# Patient Record
Sex: FEMALE | Race: BLACK OR AFRICAN AMERICAN | URBAN - METROPOLITAN AREA
[De-identification: names, ages, dates, MRNs, and addresses within clinical notes are randomized per-mention and may not be internally consistent; named-entity substitution may affect disease eponyms.]

---
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
## 2022-03-18 PROBLEM — Z02.5 SPORTS PHYSICAL: Status: ACTIVE | Noted: 2017-03-08

## 2022-03-18 PROBLEM — J45.20 MILD INTERMITTENT ASTHMA WITHOUT COMPLICATION: Status: ACTIVE | Noted: 2017-03-08

## 2022-12-16 ENCOUNTER — INITIAL PRENATAL (OUTPATIENT)
Dept: OBGYN CLINIC | Age: 22
End: 2022-12-16

## 2022-12-16 VITALS
OXYGEN SATURATION: 99 % | SYSTOLIC BLOOD PRESSURE: 111 MMHG | BODY MASS INDEX: 19.63 KG/M2 | HEART RATE: 74 BPM | WEIGHT: 115 LBS | DIASTOLIC BLOOD PRESSURE: 60 MMHG | HEIGHT: 64 IN | RESPIRATION RATE: 16 BRPM

## 2022-12-16 DIAGNOSIS — Z34.01 ENCOUNTER FOR SUPERVISION OF NORMAL FIRST PREGNANCY IN FIRST TRIMESTER: Primary | ICD-10-CM

## 2022-12-16 RX ORDER — ONDANSETRON 4 MG/1
4 TABLET, ORALLY DISINTEGRATING ORAL
COMMUNITY

## 2022-12-16 NOTE — PROGRESS NOTES
1. Have you been to the ER, urgent care clinic since your last visit? Hospitalized since your last visit? No    2. Have you seen or consulted any other health care providers outside of the [address] [place] [address] since your last visit? Include any pap smears or colon screening.  No    Visit Vitals  /60 (BP 1 Location: Right upper arm, BP Patient Position: Sitting, BP Cuff Size: Adult)   Pulse 74   Resp 16   Ht 5' 4\" (1.626 m)   Wt 115 lb (52.2 kg)   SpO2 99%   BMI 19.74 kg/m²       Chief Complaint   Patient presents with    Initial Prenatal Visit

## 2022-12-16 NOTE — PROGRESS NOTES
HISTORY OF PRESENT ILLNESS  Miri Rosales is a 25 y.o. female who presents today for the following:  Chief Complaint   Patient presents with    Initial Prenatal Visit   Is a 24-year-old G1 female who presents today as a new OB at 9 weeks and 5 days gestation. She is without complaints other than nausea on presentation today.     No Known Allergies    Current Outpatient Medications   Medication Sig    ondansetron (ZOFRAN ODT) 4 mg disintegrating tablet Take 4 mg by mouth every eight (8) hours as needed for Nausea or Vomiting. PNV WO.71/GFIGCHH fum/folic ac (PRENATAL PO) Take  by mouth. albuterol (VENTOLIN HFA) 90 mcg/actuation inhaler Take  by inhalation. No current facility-administered medications for this visit. Past Medical History:   Diagnosis Date    Asthma 2000       History reviewed. No pertinent surgical history.     Family History   Problem Relation Age of Onset    Diabetes Father     Heart murmur Father     Stroke Maternal Grandmother     Diabetes Maternal Grandfather     Heart Disease Maternal Grandfather     Cancer Paternal Grandmother     Diabetes Paternal Grandfather        Social History     Socioeconomic History    Marital status: UNKNOWN     Spouse name: Not on file    Number of children: Not on file    Years of education: Not on file    Highest education level: Not on file   Occupational History    Not on file   Tobacco Use    Smoking status: Never    Smokeless tobacco: Not on file   Substance and Sexual Activity    Alcohol use: No    Drug use: No    Sexual activity: Yes     Birth control/protection: None   Other Topics Concern    Not on file   Social History Narrative    Not on file     Social Determinants of Health     Financial Resource Strain: Not on file   Food Insecurity: Not on file   Transportation Needs: Not on file   Physical Activity: Not on file   Stress: Not on file   Social Connections: Not on file   Intimate Partner Violence: Not on file   Housing Stability: Not on file           REVIEW OF SYSTEMS     Constitutional: Negative for chills, fever and malaise/fatigue. HENT: Negative for congestion, hearing loss and sore throat. Respiratory: Negative for cough, sputum production, shortness of breath and wheezing. Cardiovascular: Negative for chest pain. Gastrointestinal: Negative for abdominal pain, constipation, diarrhea, nausea and vomiting. Genitourinary: Negative for dysuria, flank pain, hematuria and urgency. Neurological: Negative for dizziness, loss of consciousness, weakness and headaches. Psychiatric/Behavioral: Negative for depression.      PHYSICAL EXAM  /60 (BP 1 Location: Right upper arm, BP Patient Position: Sitting, BP Cuff Size: Adult)   Pulse 74   Resp 16   Ht 5' 4\" (1.626 m)   Wt 115 lb (52.2 kg)   SpO2 99%   BMI 19.74 kg/m²      Patient is a well-developed well-nourished female no apparent distress  She is alert and oriented x3  Head is normocephalic atraumatic pupils equal round react light accommodation  Neck is supple without adenopathy or thyromegaly  Heart is with regular rate and rhythm without murmurs rubs or gallops  Lungs are clear to auscultation and percussion bilaterally  Breasts are without masses bilaterally  Abdomen is soft nontender nondistended bowel sounds are present and active  Extremities are without clubbing cyanosis or edema  Pulses are full and symmetric bilaterally  Pelvic  External genitalia within normal limits  Urethra is midline there are no apparent urethral lesions the bladder is within normal limits  Vagina is with normal rugae there is minimal discharge present in the vaginal vault  Cervix is parous, there are no apparent cervical lesions, there is no cervical motion tenderness  Uterus is gravid and approximately 10 weeks size  Adnexa are without masses    ASSESSMENT and PLAN  Normal new OB visit at 9 weeks and 5 days  Plan: NU Pap sent, recommended Unisom and vitamin B6 for nausea, prenatal lab work ordered, follow-up in 4 weeks for routine OB  No results found for this visit on 12/16/22. Orders Placed This Encounter    ondansetron (ZOFRAN ODT) 4 mg disintegrating tablet     Sig: Take 4 mg by mouth every eight (8) hours as needed for Nausea or Vomiting. PNV AB.84/XRPPPJB fum/folic ac (PRENATAL PO)     Sig: Take  by mouth. Normal new OB visit at 9 weeks and 5 days. Follow-up in 4 weeks for routine OB.

## 2023-01-04 RX ORDER — PROMETHAZINE HYDROCHLORIDE 12.5 MG/1
12.5 TABLET ORAL
Qty: 30 TABLET | Refills: 3 | Status: SHIPPED | OUTPATIENT
Start: 2023-01-04

## 2023-01-13 ENCOUNTER — ROUTINE PRENATAL (OUTPATIENT)
Dept: OBGYN CLINIC | Age: 23
End: 2023-01-13
Payer: COMMERCIAL

## 2023-01-13 VITALS — SYSTOLIC BLOOD PRESSURE: 108 MMHG | DIASTOLIC BLOOD PRESSURE: 56 MMHG | WEIGHT: 118.6 LBS | BODY MASS INDEX: 20.36 KG/M2

## 2023-01-13 DIAGNOSIS — Z34.02 ENCOUNTER FOR SUPERVISION OF NORMAL FIRST PREGNANCY IN SECOND TRIMESTER: Primary | ICD-10-CM

## 2023-01-13 PROCEDURE — 0502F SUBSEQUENT PRENATAL CARE: CPT | Performed by: OBSTETRICS & GYNECOLOGY

## 2023-01-13 RX ORDER — METRONIDAZOLE 500 MG/1
500 TABLET ORAL 2 TIMES DAILY
Qty: 14 TABLET | Refills: 0 | Status: SHIPPED | OUTPATIENT
Start: 2023-01-13 | End: 2023-01-20

## 2023-01-13 NOTE — PROGRESS NOTES
Normal 13-week prenatal visit. Patient tested positive for trichomonas and bacterial vaginosis at initial prenatal visit we are now treating with Flagyl 500 mg every 12 hours x7 days.   Follow-up in 4 weeks for routine OB

## 2023-02-10 ENCOUNTER — ROUTINE PRENATAL (OUTPATIENT)
Dept: OBGYN CLINIC | Age: 23
End: 2023-02-10
Payer: COMMERCIAL

## 2023-02-10 VITALS — DIASTOLIC BLOOD PRESSURE: 60 MMHG | SYSTOLIC BLOOD PRESSURE: 112 MMHG | BODY MASS INDEX: 20.8 KG/M2 | WEIGHT: 121.2 LBS

## 2023-02-10 DIAGNOSIS — Z34.02 ENCOUNTER FOR SUPERVISION OF NORMAL FIRST PREGNANCY IN SECOND TRIMESTER: Primary | ICD-10-CM

## 2023-02-10 PROCEDURE — 0502F SUBSEQUENT PRENATAL CARE: CPT | Performed by: OBSTETRICS & GYNECOLOGY

## 2023-03-10 ENCOUNTER — ROUTINE PRENATAL (OUTPATIENT)
Dept: OBGYN CLINIC | Age: 23
End: 2023-03-10

## 2023-03-10 VITALS — WEIGHT: 126 LBS | DIASTOLIC BLOOD PRESSURE: 58 MMHG | SYSTOLIC BLOOD PRESSURE: 112 MMHG | BODY MASS INDEX: 21.63 KG/M2

## 2023-03-10 DIAGNOSIS — Z34.02 ENCOUNTER FOR SUPERVISION OF NORMAL FIRST PREGNANCY IN SECOND TRIMESTER: Primary | ICD-10-CM

## 2023-04-07 ENCOUNTER — ROUTINE PRENATAL (OUTPATIENT)
Dept: OBGYN CLINIC | Age: 23
End: 2023-04-07
Payer: COMMERCIAL

## 2023-04-07 PROCEDURE — 0502F SUBSEQUENT PRENATAL CARE: CPT | Performed by: OBSTETRICS & GYNECOLOGY

## 2023-04-28 ENCOUNTER — ROUTINE PRENATAL (OUTPATIENT)
Dept: OBGYN CLINIC | Age: 23
End: 2023-04-28
Payer: COMMERCIAL

## 2023-04-28 VITALS — BODY MASS INDEX: 22.83 KG/M2 | DIASTOLIC BLOOD PRESSURE: 63 MMHG | SYSTOLIC BLOOD PRESSURE: 120 MMHG | WEIGHT: 133 LBS

## 2023-04-28 DIAGNOSIS — Z34.83 PRENATAL CARE, SUBSEQUENT PREGNANCY, THIRD TRIMESTER: Primary | ICD-10-CM

## 2023-04-28 PROCEDURE — 0502F SUBSEQUENT PRENATAL CARE: CPT | Performed by: OBSTETRICS & GYNECOLOGY

## 2023-04-29 RX ORDER — PROMETHAZINE HYDROCHLORIDE 12.5 MG/1
12.5 TABLET ORAL EVERY 6 HOURS PRN
COMMUNITY

## 2023-04-29 RX ORDER — ALBUTEROL SULFATE 90 UG/1
2 AEROSOL, METERED RESPIRATORY (INHALATION) EVERY 6 HOURS PRN
COMMUNITY

## 2023-04-29 RX ORDER — ONDANSETRON 4 MG/1
4 TABLET, FILM COATED ORAL EVERY 8 HOURS PRN
COMMUNITY

## 2023-05-15 ENCOUNTER — ROUTINE PRENATAL (OUTPATIENT)
Age: 23
End: 2023-05-15

## 2023-05-15 VITALS — BODY MASS INDEX: 23.82 KG/M2 | SYSTOLIC BLOOD PRESSURE: 106 MMHG | WEIGHT: 138.8 LBS | DIASTOLIC BLOOD PRESSURE: 63 MMHG

## 2023-05-15 DIAGNOSIS — Z34.83 PRENATAL CARE, SUBSEQUENT PREGNANCY, THIRD TRIMESTER: Primary | ICD-10-CM

## 2023-05-15 PROCEDURE — 0502F SUBSEQUENT PRENATAL CARE: CPT | Performed by: OBSTETRICS & GYNECOLOGY

## 2023-05-15 NOTE — PROGRESS NOTES
Normal 31-week prenatal visit. Patient reports difficulty sleeping due to fetal movement. Mended body pillow but patient states she already has this. Follow-up in 2 weeks for routine OB.

## 2023-05-15 NOTE — PROGRESS NOTES
Chief Complaint   Patient presents with    Routine Prenatal Visit       /63 (Site: Left Upper Arm, Position: Sitting, Cuff Size: Small Adult)   Wt 138 lb 12.8 oz (63 kg)   BMI 23.82 kg/m²

## 2023-05-16 ENCOUNTER — HOSPITAL ENCOUNTER (OUTPATIENT)
Facility: HOSPITAL | Age: 23
Discharge: HOME | End: 2023-05-16
Attending: OBSTETRICS & GYNECOLOGY | Admitting: OBSTETRICS & GYNECOLOGY

## 2023-05-16 ENCOUNTER — TELEPHONE (OUTPATIENT)
Age: 23
End: 2023-05-16

## 2023-05-16 VITALS
DIASTOLIC BLOOD PRESSURE: 74 MMHG | RESPIRATION RATE: 16 BRPM | OXYGEN SATURATION: 100 % | HEART RATE: 81 BPM | TEMPERATURE: 98.4 F | SYSTOLIC BLOOD PRESSURE: 124 MMHG

## 2023-05-16 PROBLEM — Z33.1 PREGNANCY AS INCIDENTAL FINDING: Status: ACTIVE | Noted: 2023-05-16

## 2023-05-16 LAB
APPEARANCE UR: CLEAR
BACTERIA URNS QL MICRO: NEGATIVE /HPF
BILIRUB UR QL: NEGATIVE
COLOR UR: NORMAL
EPITH CASTS URNS QL MICRO: NORMAL /LPF
GLUCOSE UR STRIP.AUTO-MCNC: NEGATIVE MG/DL
HGB UR QL STRIP: NEGATIVE
KETONES UR QL STRIP.AUTO: NEGATIVE MG/DL
LEUKOCYTE ESTERASE UR QL STRIP.AUTO: NEGATIVE
NITRITE UR QL STRIP.AUTO: NEGATIVE
PH UR STRIP: 7 (ref 5–8)
PROT UR STRIP-MCNC: NEGATIVE MG/DL
RBC #/AREA URNS HPF: NORMAL /HPF (ref 0–5)
SP GR UR REFRACTOMETRY: <1.005 (ref 1–1.03)
URINE CULTURE IF INDICATED: NORMAL
UROBILINOGEN UR QL STRIP.AUTO: 0.1 EU/DL (ref 0.1–1)
WBC URNS QL MICRO: NORMAL /HPF (ref 0–4)

## 2023-05-16 PROCEDURE — 96361 HYDRATE IV INFUSION ADD-ON: CPT

## 2023-05-16 PROCEDURE — 2580000003 HC RX 258: Performed by: OBSTETRICS & GYNECOLOGY

## 2023-05-16 PROCEDURE — 81001 URINALYSIS AUTO W/SCOPE: CPT

## 2023-05-16 PROCEDURE — 96372 THER/PROPH/DIAG INJ SC/IM: CPT

## 2023-05-16 PROCEDURE — 99285 EMERGENCY DEPT VISIT HI MDM: CPT

## 2023-05-16 PROCEDURE — 96360 HYDRATION IV INFUSION INIT: CPT

## 2023-05-16 PROCEDURE — 6360000002 HC RX W HCPCS

## 2023-05-16 RX ORDER — SODIUM CHLORIDE, SODIUM LACTATE, POTASSIUM CHLORIDE, AND CALCIUM CHLORIDE .6; .31; .03; .02 G/100ML; G/100ML; G/100ML; G/100ML
1000 INJECTION, SOLUTION INTRAVENOUS ONCE
Status: COMPLETED | OUTPATIENT
Start: 2023-05-16 | End: 2023-05-16

## 2023-05-16 RX ORDER — TERBUTALINE SULFATE 1 MG/ML
0.25 INJECTION, SOLUTION SUBCUTANEOUS ONCE
Status: COMPLETED | OUTPATIENT
Start: 2023-05-16 | End: 2023-05-16

## 2023-05-16 RX ORDER — TERBUTALINE SULFATE 1 MG/ML
INJECTION, SOLUTION SUBCUTANEOUS
Status: COMPLETED
Start: 2023-05-16 | End: 2023-05-16

## 2023-05-16 RX ADMIN — SODIUM CHLORIDE, POTASSIUM CHLORIDE, SODIUM LACTATE AND CALCIUM CHLORIDE 1000 ML: 600; 310; 30; 20 INJECTION, SOLUTION INTRAVENOUS at 18:32

## 2023-05-16 RX ADMIN — SODIUM CHLORIDE, POTASSIUM CHLORIDE, SODIUM LACTATE AND CALCIUM CHLORIDE 1000 ML: 600; 310; 30; 20 INJECTION, SOLUTION INTRAVENOUS at 19:16

## 2023-05-16 RX ADMIN — TERBUTALINE SULFATE 0.25 MG: 1 INJECTION, SOLUTION SUBCUTANEOUS at 18:46

## 2023-05-16 NOTE — PROGRESS NOTES
1811-PT arrives to labor with complaints of abdominal pain with cramping in the lower abdomen, denies d/c, leaking or bleeding. Endorses fetal movement. Endorses urinary frequency.

## 2023-05-16 NOTE — TELEPHONE ENCOUNTER
Patient's mother called stating her daughter has been cramping since yesterday and it has gotten progressively worse. States she is drinking water and lying on her left side and nothing is helping. Advised her to take her daughter to L&D for evaluation and they were notified.

## 2023-05-17 NOTE — H&P
Department of Obstetrics and Gynecology   Obstetrics History and Physical        CHIEF COMPLAINT:   with contractions few hours   No hx of LPV  No Hx of vag bleeding    HISTORY OF PRESENT ILLNESS:      The patient is a 21 y.o. female at 32w1d. OB History          1    Para        Term                AB        Living             SAB        IAB        Ectopic        Molar        Multiple        Live Births                Patient presents with a chief complaint as above and is being admitted for observation    Estimated Due Date: Estimated Date of Delivery: 23    PRENATAL CARE:    Complicated by: none    PAST OB HISTORY:  OB History    Para Term  AB Living   1 0 0 0 0 0   SAB IAB Ectopic Molar Multiple Live Births   0 0 0 0 0 0      # Outcome Date GA Lbr Hamilton/2nd Weight Sex Delivery Anes PTL Lv   1 Current                 Past Medical History:    No past medical history on file. Past Surgical History:    No past surgical history on file. Allergies:    No Known Allergies     Social History:    Social History     Socioeconomic History    Marital status: Not on file     Spouse name: Not on file    Number of children: Not on file    Years of education: Not on file    Highest education level: Not on file   Occupational History    Not on file   Tobacco Use    Smoking status: Not on file    Smokeless tobacco: Not on file   Substance and Sexual Activity    Alcohol use: Not on file    Drug use: Not on file    Sexual activity: Not on file   Other Topics Concern    Not on file   Social History Narrative    Not on file     Social Determinants of Health     Financial Resource Strain: Not on file   Food Insecurity: Not on file   Transportation Needs: Not on file   Physical Activity: Not on file   Stress: Not on file   Social Connections: Not on file   Intimate Partner Violence: Not on file   Housing Stability: Not on file       Family History:   No family history on file.     Medications

## 2023-05-17 NOTE — PROGRESS NOTES
0: Bedside shift report received from ROBBIE Regan. Pt alert in bed states she is feeling better. : Dr. Cyndie Walker updated about pt, order received for discharge. : Discharge instructions discussed with pt, pregnancy precautions,  labor and fetal kick count reviewed. Pt to call provider or return if signs of labor, baby not moving as before, any questions or concerns. Pt encouraged to drink lots of water to keep hydrated. Pt verbalized understanding and had no questions or concerns. : Pt pushed off unit to ED in wheelchair by Rn for home accompanied by parents. Discharge papers given.

## 2023-06-02 ENCOUNTER — ROUTINE PRENATAL (OUTPATIENT)
Age: 23
End: 2023-06-02

## 2023-06-02 VITALS — WEIGHT: 143 LBS | SYSTOLIC BLOOD PRESSURE: 119 MMHG | DIASTOLIC BLOOD PRESSURE: 73 MMHG | BODY MASS INDEX: 24.55 KG/M2

## 2023-06-02 DIAGNOSIS — Z34.83 PRENATAL CARE, SUBSEQUENT PREGNANCY, THIRD TRIMESTER: Primary | ICD-10-CM

## 2023-06-22 ENCOUNTER — ROUTINE PRENATAL (OUTPATIENT)
Age: 23
End: 2023-06-22

## 2023-06-22 VITALS — SYSTOLIC BLOOD PRESSURE: 125 MMHG | DIASTOLIC BLOOD PRESSURE: 78 MMHG | WEIGHT: 149 LBS | BODY MASS INDEX: 25.58 KG/M2

## 2023-06-22 DIAGNOSIS — Z34.83 PRENATAL CARE, SUBSEQUENT PREGNANCY, THIRD TRIMESTER: Primary | ICD-10-CM

## 2023-06-30 ENCOUNTER — ROUTINE PRENATAL (OUTPATIENT)
Age: 23
End: 2023-06-30

## 2023-06-30 VITALS — SYSTOLIC BLOOD PRESSURE: 123 MMHG | BODY MASS INDEX: 26.5 KG/M2 | WEIGHT: 154.4 LBS | DIASTOLIC BLOOD PRESSURE: 72 MMHG

## 2023-06-30 DIAGNOSIS — Z34.83 PRENATAL CARE, SUBSEQUENT PREGNANCY, THIRD TRIMESTER: Primary | ICD-10-CM

## 2023-07-07 ENCOUNTER — ROUTINE PRENATAL (OUTPATIENT)
Age: 23
End: 2023-07-07

## 2023-07-07 VITALS — WEIGHT: 159 LBS | BODY MASS INDEX: 27.29 KG/M2 | SYSTOLIC BLOOD PRESSURE: 152 MMHG | DIASTOLIC BLOOD PRESSURE: 85 MMHG

## 2023-07-07 DIAGNOSIS — R03.0 ELEVATED BLOOD PRESSURE READING: Primary | ICD-10-CM

## 2023-07-07 DIAGNOSIS — Z34.83 PRENATAL CARE, SUBSEQUENT PREGNANCY, THIRD TRIMESTER: ICD-10-CM

## 2023-07-07 NOTE — PROGRESS NOTES
PA week prenatal visit with mildly elevated blood pressures. We will obtain preeclampsia labs. Follow-up in 1 week for routine OB.

## 2023-07-08 ENCOUNTER — HOSPITAL ENCOUNTER (OUTPATIENT)
Facility: HOSPITAL | Age: 23
Discharge: HOME | End: 2023-07-09
Attending: OBSTETRICS & GYNECOLOGY | Admitting: OBSTETRICS & GYNECOLOGY
Payer: COMMERCIAL

## 2023-07-08 PROBLEM — Z34.90 TERM PREGNANCY: Status: ACTIVE | Noted: 2023-07-08

## 2023-07-08 LAB
ALBUMIN SERPL-MCNC: 2.6 G/DL (ref 3.5–5)
ALBUMIN SERPL-MCNC: 2.6 G/DL (ref 3.5–5)
ALBUMIN SERPL-MCNC: 3.6 G/DL (ref 3.9–5)
ALBUMIN/GLOB SERPL: 0.7 (ref 1.1–2.2)
ALBUMIN/GLOB SERPL: 0.7 (ref 1.1–2.2)
ALBUMIN/GLOB SERPL: 1.4 {RATIO} (ref 1.2–2.2)
ALP SERPL-CCNC: 475 U/L (ref 45–117)
ALP SERPL-CCNC: 480 U/L (ref 45–117)
ALP SERPL-CCNC: 491 IU/L (ref 44–121)
ALT SERPL-CCNC: 12 IU/L (ref 0–32)
ALT SERPL-CCNC: 18 U/L (ref 12–78)
ALT SERPL-CCNC: 18 U/L (ref 12–78)
ANION GAP SERPL CALC-SCNC: 5 MMOL/L (ref 5–15)
AST SERPL W P-5'-P-CCNC: 17 U/L (ref 15–37)
AST SERPL W P-5'-P-CCNC: 18 U/L (ref 15–37)
AST SERPL-CCNC: 20 IU/L (ref 0–40)
BASOPHILS # BLD AUTO: 0 X10E3/UL (ref 0–0.2)
BASOPHILS # BLD: 0 K/UL (ref 0–0.1)
BASOPHILS NFR BLD AUTO: 0 %
BASOPHILS NFR BLD: 0 % (ref 0–1)
BILIRUB DIRECT SERPL-MCNC: <0.1 MG/DL (ref 0–0.2)
BILIRUB SERPL-MCNC: 0.3 MG/DL (ref 0.2–1)
BILIRUB SERPL-MCNC: 0.3 MG/DL (ref 0.2–1)
BILIRUB SERPL-MCNC: 0.3 MG/DL (ref 0–1.2)
BUN SERPL-MCNC: 3 MG/DL (ref 6–20)
BUN SERPL-MCNC: 4 MG/DL (ref 6–20)
BUN/CREAT SERPL: 5 (ref 9–23)
BUN/CREAT SERPL: 6 (ref 12–20)
CA-I BLD-MCNC: 8.8 MG/DL (ref 8.5–10.1)
CALCIUM SERPL-MCNC: 8.7 MG/DL (ref 8.7–10.2)
CHLORIDE SERPL-SCNC: 104 MMOL/L (ref 96–106)
CHLORIDE SERPL-SCNC: 109 MMOL/L (ref 97–108)
CO2 SERPL-SCNC: 22 MMOL/L (ref 20–29)
CO2 SERPL-SCNC: 25 MMOL/L (ref 21–32)
CREAT SERPL-MCNC: 0.62 MG/DL (ref 0.57–1)
CREAT SERPL-MCNC: 0.69 MG/DL (ref 0.55–1.02)
CREAT UR-MCNC: <13 MG/DL
DIFFERENTIAL METHOD BLD: ABNORMAL
EGFRCR SERPLBLD CKD-EPI 2021: 128 ML/MIN/1.73
EOSINOPHIL # BLD AUTO: 0 X10E3/UL (ref 0–0.4)
EOSINOPHIL # BLD: 0 K/UL (ref 0–0.4)
EOSINOPHIL NFR BLD AUTO: 0 %
EOSINOPHIL NFR BLD: 0 % (ref 0–7)
ERYTHROCYTE [DISTWIDTH] IN BLOOD BY AUTOMATED COUNT: 14.1 % (ref 11.7–15.4)
ERYTHROCYTE [DISTWIDTH] IN BLOOD BY AUTOMATED COUNT: 14.5 % (ref 11.5–14.5)
GLOBULIN SER CALC-MCNC: 2.5 G/DL (ref 1.5–4.5)
GLOBULIN SER CALC-MCNC: 3.7 G/DL (ref 2–4)
GLOBULIN SER CALC-MCNC: 3.8 G/DL (ref 2–4)
GLUCOSE SERPL-MCNC: 69 MG/DL (ref 70–99)
GLUCOSE SERPL-MCNC: 79 MG/DL (ref 65–100)
HCT VFR BLD AUTO: 29 % (ref 35–47)
HCT VFR BLD AUTO: 29.8 % (ref 34–46.6)
HGB BLD-MCNC: 9.5 G/DL (ref 11.5–16)
HGB BLD-MCNC: 9.9 G/DL (ref 11.1–15.9)
IMM GRANULOCYTES # BLD AUTO: 0.1 K/UL (ref 0–0.04)
IMM GRANULOCYTES # BLD AUTO: 0.1 X10E3/UL (ref 0–0.1)
IMM GRANULOCYTES NFR BLD AUTO: 2 %
IMM GRANULOCYTES NFR BLD AUTO: 2 % (ref 0–0.5)
LYMPHOCYTES # BLD AUTO: 0.9 X10E3/UL (ref 0.7–3.1)
LYMPHOCYTES # BLD: 1.3 K/UL (ref 0.8–3.5)
LYMPHOCYTES NFR BLD AUTO: 13 %
LYMPHOCYTES NFR BLD: 18 % (ref 12–49)
MCH RBC QN AUTO: 30.2 PG (ref 26–34)
MCH RBC QN AUTO: 30.3 PG (ref 26.6–33)
MCHC RBC AUTO-ENTMCNC: 32.8 G/DL (ref 30–36.5)
MCHC RBC AUTO-ENTMCNC: 33.2 G/DL (ref 31.5–35.7)
MCV RBC AUTO: 91 FL (ref 79–97)
MCV RBC AUTO: 92.1 FL (ref 80–99)
MONOCYTES # BLD AUTO: 0.7 X10E3/UL (ref 0.1–0.9)
MONOCYTES # BLD: 0.7 K/UL (ref 0–1)
MONOCYTES NFR BLD AUTO: 10 %
MONOCYTES NFR BLD: 10 % (ref 5–13)
NEUTROPHILS # BLD AUTO: 5 X10E3/UL (ref 1.4–7)
NEUTROPHILS NFR BLD AUTO: 75 %
NEUTS SEG # BLD: 5.1 K/UL (ref 1.8–8)
NEUTS SEG NFR BLD: 70 % (ref 32–75)
NRBC # BLD: 0.04 K/UL (ref 0–0.01)
NRBC BLD-RTO: 0.6 PER 100 WBC
PLATELET # BLD AUTO: 237 X10E3/UL (ref 150–450)
PLATELET # BLD AUTO: 238 K/UL (ref 150–400)
PMV BLD AUTO: 11 FL (ref 8.9–12.9)
POTASSIUM SERPL-SCNC: 3.8 MMOL/L (ref 3.5–5.1)
POTASSIUM SERPL-SCNC: 4.1 MMOL/L (ref 3.5–5.2)
PROT SERPL-MCNC: 6.1 G/DL (ref 6–8.5)
PROT SERPL-MCNC: 6.3 G/DL (ref 6.4–8.2)
PROT SERPL-MCNC: 6.4 G/DL (ref 6.4–8.2)
PROT UR-MCNC: <5 MG/DL (ref 0–11.9)
PROT/CREAT UR-RTO: NORMAL
RBC # BLD AUTO: 3.15 M/UL (ref 3.8–5.2)
RBC # BLD AUTO: 3.27 X10E6/UL (ref 3.77–5.28)
SODIUM SERPL-SCNC: 139 MMOL/L (ref 136–145)
SODIUM SERPL-SCNC: 140 MMOL/L (ref 134–144)
URATE SERPL-MCNC: 5 MG/DL (ref 2.6–6.2)
WBC # BLD AUTO: 6.6 X10E3/UL (ref 3.4–10.8)
WBC # BLD AUTO: 7.2 K/UL (ref 3.6–11)

## 2023-07-08 PROCEDURE — 84156 ASSAY OF PROTEIN URINE: CPT

## 2023-07-08 PROCEDURE — 80076 HEPATIC FUNCTION PANEL: CPT

## 2023-07-08 PROCEDURE — 82570 ASSAY OF URINE CREATININE: CPT

## 2023-07-08 PROCEDURE — 80053 COMPREHEN METABOLIC PANEL: CPT

## 2023-07-08 PROCEDURE — 36415 COLL VENOUS BLD VENIPUNCTURE: CPT

## 2023-07-08 PROCEDURE — 85025 COMPLETE CBC W/AUTO DIFF WBC: CPT

## 2023-07-09 VITALS
OXYGEN SATURATION: 99 % | WEIGHT: 159 LBS | HEART RATE: 69 BPM | TEMPERATURE: 99.1 F | RESPIRATION RATE: 20 BRPM | BODY MASS INDEX: 27.14 KG/M2 | HEIGHT: 64 IN | SYSTOLIC BLOOD PRESSURE: 139 MMHG | DIASTOLIC BLOOD PRESSURE: 82 MMHG

## 2023-07-09 PROCEDURE — 99213 OFFICE O/P EST LOW 20 MIN: CPT | Performed by: OBSTETRICS & GYNECOLOGY

## 2023-07-09 PROCEDURE — 59025 FETAL NON-STRESS TEST: CPT

## 2023-07-09 PROCEDURE — 0502F SUBSEQUENT PRENATAL CARE: CPT | Performed by: OBSTETRICS & GYNECOLOGY

## 2023-07-09 PROCEDURE — 99285 EMERGENCY DEPT VISIT HI MDM: CPT

## 2023-07-09 NOTE — H&P
Department of Obstetrics and Gynecology   Obstetrics History and Physical        CHIEF COMPLAINT:  ANIL ALFREDO with Iup 39 weeks   Sent for BP evaluation with hx of elevated Bps in office  No Hx of Headache, blurring of vision    HISTORY OF PRESENT ILLNESS:      The patient is a 21 y.o. female at 39w0d. OB History          1    Para        Term                AB        Living             SAB        IAB        Ectopic        Molar        Multiple        Live Births                Patient presents with a chief complaint as above and is being admitted for hypertension and observation    Estimated Due Date: Estimated Date of Delivery: 23    PRENATAL CARE:    Complicated by: none    PAST OB HISTORY:  OB History    Para Term  AB Living   1 0 0 0 0 0   SAB IAB Ectopic Molar Multiple Live Births   0 0 0 0 0 0      # Outcome Date GA Lbr Hamilton/2nd Weight Sex Delivery Anes PTL Lv   1 Current                 Past Medical History:        Diagnosis Date    Asthma          Past Surgical History:    No past surgical history on file.     Allergies:    No Known Allergies     Social History:    Social History     Socioeconomic History    Marital status: Unknown     Spouse name: Not on file    Number of children: Not on file    Years of education: Not on file    Highest education level: Not on file   Occupational History    Not on file   Tobacco Use    Smoking status: Never    Smokeless tobacco: Never   Substance and Sexual Activity    Alcohol use: Not Currently    Drug use: Never    Sexual activity: Yes     Partners: Male   Other Topics Concern    Not on file   Social History Narrative    Not on file     Social Determinants of Health     Financial Resource Strain: Not on file   Food Insecurity: Not on file   Transportation Needs: Not on file   Physical Activity: Not on file   Stress: Not on file   Social Connections: Not on file   Intimate Partner Violence: Not on file   Housing Stability: Not on file

## 2023-07-10 ENCOUNTER — ROUTINE PRENATAL (OUTPATIENT)
Age: 23
End: 2023-07-10

## 2023-07-10 VITALS — WEIGHT: 158 LBS | DIASTOLIC BLOOD PRESSURE: 91 MMHG | BODY MASS INDEX: 27.12 KG/M2 | SYSTOLIC BLOOD PRESSURE: 145 MMHG

## 2023-07-10 DIAGNOSIS — Z34.83 PRENATAL CARE, SUBSEQUENT PREGNANCY, THIRD TRIMESTER: Primary | ICD-10-CM

## 2023-07-10 PROCEDURE — 0502F SUBSEQUENT PRENATAL CARE: CPT | Performed by: OBSTETRICS & GYNECOLOGY

## 2023-07-10 NOTE — PROGRESS NOTES
Prenatal visit with mildly elevated blood pressures at 39 weeks and 1 day. We will schedule scheduled for induction of labor to start with Cytotec on 7/11/2023 and induction to proceed on 7/12/2023.

## 2023-07-11 ENCOUNTER — HOSPITAL ENCOUNTER (INPATIENT)
Facility: HOSPITAL | Age: 23
LOS: 4 days | Discharge: HOME | End: 2023-07-15
Attending: OBSTETRICS & GYNECOLOGY | Admitting: OBSTETRICS & GYNECOLOGY
Payer: COMMERCIAL

## 2023-07-11 PROBLEM — Z3A.39 39 WEEKS GESTATION OF PREGNANCY: Status: ACTIVE | Noted: 2023-07-11

## 2023-07-11 LAB
ABO + RH BLD: NORMAL
ALBUMIN SERPL-MCNC: 2.8 G/DL (ref 3.5–5)
ALBUMIN/GLOB SERPL: 0.8 (ref 1.1–2.2)
ALP SERPL-CCNC: 485 U/L (ref 45–117)
ALT SERPL-CCNC: 17 U/L (ref 12–78)
AMPHET UR QL SCN: NEGATIVE
ANION GAP SERPL CALC-SCNC: 6 MMOL/L (ref 5–15)
APPEARANCE UR: CLEAR
AST SERPL W P-5'-P-CCNC: 18 U/L (ref 15–37)
BACTERIA URNS QL MICRO: ABNORMAL /HPF
BARBITURATES UR QL SCN: NEGATIVE
BASOPHILS # BLD: 0 K/UL (ref 0–0.1)
BASOPHILS NFR BLD: 0 % (ref 0–1)
BENZODIAZ UR QL: NEGATIVE
BILIRUB SERPL-MCNC: 0.4 MG/DL (ref 0.2–1)
BILIRUB UR QL: NEGATIVE
BLOOD GROUP ANTIBODIES SERPL: NEGATIVE
BUN SERPL-MCNC: 6 MG/DL (ref 6–20)
BUN/CREAT SERPL: 7 (ref 12–20)
CA-I BLD-MCNC: 8.9 MG/DL (ref 8.5–10.1)
CANNABINOIDS UR QL SCN: NEGATIVE
CHLORIDE SERPL-SCNC: 111 MMOL/L (ref 97–108)
CO2 SERPL-SCNC: 24 MMOL/L (ref 21–32)
COCAINE UR QL SCN: NEGATIVE
COLOR UR: ABNORMAL
CREAT SERPL-MCNC: 0.82 MG/DL (ref 0.55–1.02)
DIFFERENTIAL METHOD BLD: ABNORMAL
EOSINOPHIL # BLD: 0 K/UL (ref 0–0.4)
EOSINOPHIL NFR BLD: 0 % (ref 0–7)
EPITH CASTS URNS QL MICRO: ABNORMAL /LPF
ERYTHROCYTE [DISTWIDTH] IN BLOOD BY AUTOMATED COUNT: 14.9 % (ref 11.5–14.5)
GLOBULIN SER CALC-MCNC: 3.7 G/DL (ref 2–4)
GLUCOSE SERPL-MCNC: 132 MG/DL (ref 65–100)
GLUCOSE UR STRIP.AUTO-MCNC: NEGATIVE MG/DL
HCT VFR BLD AUTO: 29.1 % (ref 35–47)
HGB BLD-MCNC: 9.5 G/DL (ref 11.5–16)
HGB UR QL STRIP: NEGATIVE
IMM GRANULOCYTES # BLD AUTO: 0.1 K/UL (ref 0–0.04)
IMM GRANULOCYTES NFR BLD AUTO: 1 % (ref 0–0.5)
KETONES UR QL STRIP.AUTO: NEGATIVE MG/DL
LEUKOCYTE ESTERASE UR QL STRIP.AUTO: ABNORMAL
LYMPHOCYTES # BLD: 1 K/UL (ref 0.8–3.5)
LYMPHOCYTES NFR BLD: 15 % (ref 12–49)
Lab: NORMAL
MCH RBC QN AUTO: 29.9 PG (ref 26–34)
MCHC RBC AUTO-ENTMCNC: 32.6 G/DL (ref 30–36.5)
MCV RBC AUTO: 91.5 FL (ref 80–99)
METHADONE UR QL: NEGATIVE
MONOCYTES # BLD: 0.5 K/UL (ref 0–1)
MONOCYTES NFR BLD: 8 % (ref 5–13)
MUCOUS THREADS URNS QL MICRO: ABNORMAL /LPF
NEUTS SEG # BLD: 4.8 K/UL (ref 1.8–8)
NEUTS SEG NFR BLD: 76 % (ref 32–75)
NITRITE UR QL STRIP.AUTO: NEGATIVE
NRBC # BLD: 0.02 K/UL (ref 0–0.01)
NRBC BLD-RTO: 0.3 PER 100 WBC
OPIATES UR QL: NEGATIVE
PCP UR QL: NEGATIVE
PH UR STRIP: 8 (ref 5–8)
PLATELET # BLD AUTO: 254 K/UL (ref 150–400)
PMV BLD AUTO: 11.5 FL (ref 8.9–12.9)
POTASSIUM SERPL-SCNC: 3.5 MMOL/L (ref 3.5–5.1)
PROT SERPL-MCNC: 6.5 G/DL (ref 6.4–8.2)
PROT UR STRIP-MCNC: NEGATIVE MG/DL
RBC # BLD AUTO: 3.18 M/UL (ref 3.8–5.2)
RBC #/AREA URNS HPF: ABNORMAL /HPF (ref 0–5)
SODIUM SERPL-SCNC: 141 MMOL/L (ref 136–145)
SP GR UR REFRACTOMETRY: <1.005 (ref 1–1.03)
SPECIMEN EXP DATE BLD: NORMAL
UROBILINOGEN UR QL STRIP.AUTO: 0.1 EU/DL (ref 0.1–1)
WBC # BLD AUTO: 6.3 K/UL (ref 3.6–11)
WBC URNS QL MICRO: ABNORMAL /HPF (ref 0–4)

## 2023-07-11 PROCEDURE — 86901 BLOOD TYPING SEROLOGIC RH(D): CPT

## 2023-07-11 PROCEDURE — 80307 DRUG TEST PRSMV CHEM ANLYZR: CPT

## 2023-07-11 PROCEDURE — 6370000000 HC RX 637 (ALT 250 FOR IP): Performed by: OBSTETRICS & GYNECOLOGY

## 2023-07-11 PROCEDURE — 80053 COMPREHEN METABOLIC PANEL: CPT

## 2023-07-11 PROCEDURE — 36415 COLL VENOUS BLD VENIPUNCTURE: CPT

## 2023-07-11 PROCEDURE — 81001 URINALYSIS AUTO W/SCOPE: CPT

## 2023-07-11 PROCEDURE — 86850 RBC ANTIBODY SCREEN: CPT

## 2023-07-11 PROCEDURE — 1120000000 HC RM PRIVATE OB

## 2023-07-11 PROCEDURE — 86900 BLOOD TYPING SEROLOGIC ABO: CPT

## 2023-07-11 PROCEDURE — 85025 COMPLETE CBC W/AUTO DIFF WBC: CPT

## 2023-07-11 RX ORDER — SODIUM CHLORIDE, SODIUM LACTATE, POTASSIUM CHLORIDE, CALCIUM CHLORIDE 600; 310; 30; 20 MG/100ML; MG/100ML; MG/100ML; MG/100ML
INJECTION, SOLUTION INTRAVENOUS CONTINUOUS
Status: DISCONTINUED | OUTPATIENT
Start: 2023-07-12 | End: 2023-07-14

## 2023-07-11 RX ORDER — SODIUM CHLORIDE, SODIUM LACTATE, POTASSIUM CHLORIDE, AND CALCIUM CHLORIDE .6; .31; .03; .02 G/100ML; G/100ML; G/100ML; G/100ML
1000 INJECTION, SOLUTION INTRAVENOUS PRN
Status: DISCONTINUED | OUTPATIENT
Start: 2023-07-11 | End: 2023-07-14

## 2023-07-11 RX ORDER — SODIUM CHLORIDE, SODIUM LACTATE, POTASSIUM CHLORIDE, CALCIUM CHLORIDE 600; 310; 30; 20 MG/100ML; MG/100ML; MG/100ML; MG/100ML
INJECTION, SOLUTION INTRAVENOUS CONTINUOUS
Status: DISCONTINUED | OUTPATIENT
Start: 2023-07-11 | End: 2023-07-11

## 2023-07-11 RX ORDER — SODIUM CHLORIDE, SODIUM LACTATE, POTASSIUM CHLORIDE, AND CALCIUM CHLORIDE .6; .31; .03; .02 G/100ML; G/100ML; G/100ML; G/100ML
500 INJECTION, SOLUTION INTRAVENOUS PRN
Status: DISCONTINUED | OUTPATIENT
Start: 2023-07-11 | End: 2023-07-14

## 2023-07-11 RX ORDER — METHYLERGONOVINE MALEATE 0.2 MG/ML
200 INJECTION INTRAVENOUS PRN
Status: DISCONTINUED | OUTPATIENT
Start: 2023-07-11 | End: 2023-07-14

## 2023-07-11 RX ORDER — SODIUM CHLORIDE 0.9 % (FLUSH) 0.9 %
5-40 SYRINGE (ML) INJECTION PRN
Status: DISCONTINUED | OUTPATIENT
Start: 2023-07-11 | End: 2023-07-14

## 2023-07-11 RX ORDER — ACETAMINOPHEN 325 MG/1
650 TABLET ORAL EVERY 4 HOURS PRN
Status: DISCONTINUED | OUTPATIENT
Start: 2023-07-11 | End: 2023-07-14

## 2023-07-11 RX ORDER — SODIUM CHLORIDE 0.9 % (FLUSH) 0.9 %
5-40 SYRINGE (ML) INJECTION EVERY 12 HOURS SCHEDULED
Status: DISCONTINUED | OUTPATIENT
Start: 2023-07-11 | End: 2023-07-14

## 2023-07-11 RX ORDER — SODIUM CHLORIDE 9 MG/ML
25 INJECTION, SOLUTION INTRAVENOUS PRN
Status: DISCONTINUED | OUTPATIENT
Start: 2023-07-11 | End: 2023-07-14

## 2023-07-11 RX ORDER — MISOPROSTOL 200 UG/1
800 TABLET ORAL PRN
Status: DISCONTINUED | OUTPATIENT
Start: 2023-07-11 | End: 2023-07-14

## 2023-07-11 RX ORDER — ONDANSETRON 2 MG/ML
4 INJECTION INTRAMUSCULAR; INTRAVENOUS EVERY 6 HOURS PRN
Status: DISCONTINUED | OUTPATIENT
Start: 2023-07-11 | End: 2023-07-14

## 2023-07-11 RX ORDER — CARBOPROST TROMETHAMINE 250 UG/ML
250 INJECTION, SOLUTION INTRAMUSCULAR PRN
Status: DISCONTINUED | OUTPATIENT
Start: 2023-07-11 | End: 2023-07-14

## 2023-07-11 RX ORDER — FENTANYL CITRATE 50 UG/ML
50 INJECTION, SOLUTION INTRAMUSCULAR; INTRAVENOUS
Status: DISCONTINUED | OUTPATIENT
Start: 2023-07-11 | End: 2023-07-14

## 2023-07-11 RX ORDER — ZOLPIDEM TARTRATE 5 MG/1
5 TABLET ORAL NIGHTLY PRN
Status: DISCONTINUED | OUTPATIENT
Start: 2023-07-11 | End: 2023-07-15 | Stop reason: HOSPADM

## 2023-07-11 RX ADMIN — Medication 25 MCG: at 20:36

## 2023-07-11 NOTE — H&P
History & Physical    Name: Courtney Sauceda MRN: [id number]  SSN: [id number]    YOB: 2000  Age: 21 y.o. Sex: female        Subjective:     Estimated Date of Delivery: 23  OB History          1    Para   0    Term   0       0    AB   0    Living             SAB   0    IAB   0    Ectopic   0    Molar   0    Multiple        Live Births                    Ms. Willow Yi is admitted with pregnancy at 39w2d for induction of labor. Prenatal course was complicated by  elevated blood pressure . Please see prenatal records for details. Past Medical History:   Diagnosis Date    Asthma     Hypoglycemia     Hypotension      History reviewed. No pertinent surgical history. Social History     Occupational History    Not on file   Tobacco Use    Smoking status: Former     Types: Cigarettes    Smokeless tobacco: Never   Substance and Sexual Activity    Alcohol use: Not Currently    Drug use: Never    Sexual activity: Yes     Partners: Male     Family History   Problem Relation Age of Onset     Labor Mother     Breast Cancer Paternal Grandmother        No Known Allergies  Prior to Admission medications    Medication Sig Start Date End Date Taking? Authorizing Provider   promethazine (PHENERGAN) 12.5 MG tablet Take 1 tablet by mouth every 6 hours as needed for Nausea  Patient not taking: Reported on 2023    Historical Provider, MD   ondansetron (ZOFRAN) 4 MG tablet Take 1 tablet by mouth every 8 hours as needed for Nausea or Vomiting  Patient not taking: Reported on 2023    Historical Provider, MD   Prenatal Vit-Iron Carbonyl-FA (PNV TABS 29-1 PO) Take by mouth    Historical Provider, MD   albuterol sulfate HFA (PROVENTIL;VENTOLIN;PROAIR) 108 (90 Base) MCG/ACT inhaler Inhale 2 puffs into the lungs every 6 hours as needed for Wheezing  Patient not taking: Reported on 2023    Historical Provider, MD        Review of Systems: Pertinent items are noted in HPI.     Objective:

## 2023-07-11 NOTE — PROGRESS NOTES
1908-MD at bedside, SVCOLEEN noted to be closed, orders rcvd for Cytotect at 20:00, 00:00, and 04:00.

## 2023-07-12 ENCOUNTER — ANESTHESIA EVENT (OUTPATIENT)
Facility: HOSPITAL | Age: 23
DRG: 560 | End: 2023-07-12
Payer: COMMERCIAL

## 2023-07-12 ENCOUNTER — ANESTHESIA (OUTPATIENT)
Facility: HOSPITAL | Age: 23
DRG: 560 | End: 2023-07-12
Payer: COMMERCIAL

## 2023-07-12 PROCEDURE — 7100000000 HC PACU RECOVERY - FIRST 15 MIN

## 2023-07-12 PROCEDURE — 6360000002 HC RX W HCPCS: Performed by: OBSTETRICS & GYNECOLOGY

## 2023-07-12 PROCEDURE — 10907ZC DRAINAGE OF AMNIOTIC FLUID, THERAPEUTIC FROM PRODUCTS OF CONCEPTION, VIA NATURAL OR ARTIFICIAL OPENING: ICD-10-PCS | Performed by: OBSTETRICS & GYNECOLOGY

## 2023-07-12 PROCEDURE — 2580000003 HC RX 258: Performed by: OBSTETRICS & GYNECOLOGY

## 2023-07-12 PROCEDURE — 62325 NJX INTERLAMINAR CRV/THRC: CPT | Performed by: ANESTHESIOLOGY

## 2023-07-12 PROCEDURE — 6370000000 HC RX 637 (ALT 250 FOR IP): Performed by: OBSTETRICS & GYNECOLOGY

## 2023-07-12 PROCEDURE — 6360000002 HC RX W HCPCS: Performed by: NURSE ANESTHETIST, CERTIFIED REGISTERED

## 2023-07-12 PROCEDURE — 7100000001 HC PACU RECOVERY - ADDTL 15 MIN

## 2023-07-12 PROCEDURE — 0KQM0ZZ REPAIR PERINEUM MUSCLE, OPEN APPROACH: ICD-10-PCS | Performed by: OBSTETRICS & GYNECOLOGY

## 2023-07-12 PROCEDURE — 2500000003 HC RX 250 WO HCPCS: Performed by: NURSE ANESTHETIST, CERTIFIED REGISTERED

## 2023-07-12 PROCEDURE — 3700000156 HC EPIDURAL ANESTHESIA

## 2023-07-12 PROCEDURE — 4A1HX4Z MONITORING OF PRODUCTS OF CONCEPTION, CARDIAC ELECTRICAL ACTIVITY, EXTERNAL APPROACH: ICD-10-PCS | Performed by: OBSTETRICS & GYNECOLOGY

## 2023-07-12 PROCEDURE — 7220000101 HC DELIVERY VAGINAL/SINGLE

## 2023-07-12 PROCEDURE — 00HU33Z INSERTION OF INFUSION DEVICE INTO SPINAL CANAL, PERCUTANEOUS APPROACH: ICD-10-PCS | Performed by: OBSTETRICS & GYNECOLOGY

## 2023-07-12 PROCEDURE — 1120000000 HC RM PRIVATE OB

## 2023-07-12 PROCEDURE — 7210000100 HC LABOR FEE PER 1 HR

## 2023-07-12 RX ORDER — SODIUM CHLORIDE 0.9 % (FLUSH) 0.9 %
5-40 SYRINGE (ML) INJECTION EVERY 12 HOURS SCHEDULED
Status: DISCONTINUED | OUTPATIENT
Start: 2023-07-12 | End: 2023-07-15 | Stop reason: HOSPADM

## 2023-07-12 RX ORDER — ONDANSETRON 2 MG/ML
4 INJECTION INTRAMUSCULAR; INTRAVENOUS EVERY 6 HOURS PRN
Status: DISCONTINUED | OUTPATIENT
Start: 2023-07-12 | End: 2023-07-14

## 2023-07-12 RX ORDER — FENTANYL CITRATE 50 UG/ML
INJECTION, SOLUTION INTRAMUSCULAR; INTRAVENOUS PRN
Status: DISCONTINUED | OUTPATIENT
Start: 2023-07-12 | End: 2023-07-12 | Stop reason: SDUPTHER

## 2023-07-12 RX ORDER — FENTANYL 0.2 MG/100ML-BUPIV 0.125%-NACL 0.9% EPIDURAL INJ 2/0.125 MCG/ML-%
10 SOLUTION INJECTION CONTINUOUS
Status: DISCONTINUED | OUTPATIENT
Start: 2023-07-12 | End: 2023-07-14

## 2023-07-12 RX ORDER — NALOXONE HYDROCHLORIDE 0.4 MG/ML
INJECTION, SOLUTION INTRAMUSCULAR; INTRAVENOUS; SUBCUTANEOUS PRN
Status: DISCONTINUED | OUTPATIENT
Start: 2023-07-12 | End: 2023-07-14

## 2023-07-12 RX ORDER — DOCUSATE SODIUM 100 MG/1
100 CAPSULE, LIQUID FILLED ORAL 2 TIMES DAILY
Status: DISCONTINUED | OUTPATIENT
Start: 2023-07-12 | End: 2023-07-15 | Stop reason: HOSPADM

## 2023-07-12 RX ORDER — SODIUM CHLORIDE 0.9 % (FLUSH) 0.9 %
5-40 SYRINGE (ML) INJECTION PRN
Status: DISCONTINUED | OUTPATIENT
Start: 2023-07-12 | End: 2023-07-15 | Stop reason: HOSPADM

## 2023-07-12 RX ORDER — IBUPROFEN 800 MG/1
800 TABLET ORAL EVERY 8 HOURS SCHEDULED
Status: DISCONTINUED | OUTPATIENT
Start: 2023-07-12 | End: 2023-07-15 | Stop reason: HOSPADM

## 2023-07-12 RX ORDER — BUPIVACAINE HYDROCHLORIDE 2.5 MG/ML
INJECTION, SOLUTION EPIDURAL; INFILTRATION; INTRACAUDAL PRN
Status: DISCONTINUED | OUTPATIENT
Start: 2023-07-12 | End: 2023-07-12 | Stop reason: SDUPTHER

## 2023-07-12 RX ORDER — SODIUM CHLORIDE 9 MG/ML
INJECTION, SOLUTION INTRAVENOUS PRN
Status: DISCONTINUED | OUTPATIENT
Start: 2023-07-12 | End: 2023-07-15 | Stop reason: HOSPADM

## 2023-07-12 RX ORDER — SODIUM CHLORIDE, SODIUM LACTATE, POTASSIUM CHLORIDE, CALCIUM CHLORIDE 600; 310; 30; 20 MG/100ML; MG/100ML; MG/100ML; MG/100ML
INJECTION, SOLUTION INTRAVENOUS CONTINUOUS
Status: DISCONTINUED | OUTPATIENT
Start: 2023-07-12 | End: 2023-07-14

## 2023-07-12 RX ORDER — MODIFIED LANOLIN
OINTMENT (GRAM) TOPICAL PRN
Status: DISCONTINUED | OUTPATIENT
Start: 2023-07-12 | End: 2023-07-15 | Stop reason: HOSPADM

## 2023-07-12 RX ADMIN — Medication 10 ML/HR: at 14:43

## 2023-07-12 RX ADMIN — Medication 166.7 ML: at 17:55

## 2023-07-12 RX ADMIN — Medication 2 MILLI-UNITS/MIN: at 08:10

## 2023-07-12 RX ADMIN — ONDANSETRON 4 MG: 2 INJECTION INTRAMUSCULAR; INTRAVENOUS at 11:10

## 2023-07-12 RX ADMIN — Medication 10 ML/HR: at 06:32

## 2023-07-12 RX ADMIN — SODIUM CHLORIDE, POTASSIUM CHLORIDE, SODIUM LACTATE AND CALCIUM CHLORIDE: 600; 310; 30; 20 INJECTION, SOLUTION INTRAVENOUS at 01:01

## 2023-07-12 RX ADMIN — FENTANYL CITRATE 50 MCG: 50 INJECTION, SOLUTION INTRAMUSCULAR; INTRAVENOUS at 01:18

## 2023-07-12 RX ADMIN — DOCUSATE SODIUM 100 MG: 100 CAPSULE, LIQUID FILLED ORAL at 22:43

## 2023-07-12 RX ADMIN — IBUPROFEN 800 MG: 800 TABLET, FILM COATED ORAL at 22:43

## 2023-07-12 RX ADMIN — BUPIVACAINE HYDROCHLORIDE 5 ML: 2.5 INJECTION, SOLUTION EPIDURAL; INFILTRATION; INTRACAUDAL; PERINEURAL at 05:35

## 2023-07-12 RX ADMIN — SODIUM CHLORIDE, POTASSIUM CHLORIDE, SODIUM LACTATE AND CALCIUM CHLORIDE: 600; 310; 30; 20 INJECTION, SOLUTION INTRAVENOUS at 12:55

## 2023-07-12 RX ADMIN — FENTANYL CITRATE 50 MCG: 50 INJECTION, SOLUTION INTRAMUSCULAR; INTRAVENOUS at 05:35

## 2023-07-12 ASSESSMENT — PAIN DESCRIPTION - DESCRIPTORS: DESCRIPTORS: ACHING;CRAMPING;DISCOMFORT

## 2023-07-12 ASSESSMENT — PAIN DESCRIPTION - LOCATION: LOCATION: ABDOMEN;RECTUM

## 2023-07-12 ASSESSMENT — PAIN - FUNCTIONAL ASSESSMENT: PAIN_FUNCTIONAL_ASSESSMENT: ACTIVITIES ARE NOT PREVENTED

## 2023-07-12 ASSESSMENT — PAIN DESCRIPTION - ORIENTATION: ORIENTATION: ANTERIOR;LOWER

## 2023-07-12 NOTE — PROGRESS NOTES
26- Shift report from night shift RNSymone Wu- MD at bedside to see patient. Cervical exam performed, patient is 6cm per MD. AROM, clear fluid noted. Patient repositioned on right lateral side at this time with no complaints. Family at bedside. Verbal orders for pitocin from MD.    1430- Patient called out stating she is feeling increased vaginal pressure. Writer at bedside, patient is 10 cm per writer MD notified. Patient to labor down at this time. Patient repositioned to throne position, family at bedside. 1500- Writer at bedside, patient repositioned to left lateral side with peanut ball in place. 1600- Writer at bedside, patient repositioned to right lateral side at this time. 1620- Patient called out stating she is feeling increased pressure and is ready to push. Writer at bedside, MD notified. Patient to begin pushing at this time. 200- MD at bedside for delivery. Vacuum applied at this time. 56- Live male infant born, see delivery summary for details. 1910- Bedside shift report to night shift RN.

## 2023-07-12 NOTE — L&D DELIVERY NOTE
7/11/2023 1900-7/12/2023 0700:    Pt is an IOL for PIH. She received Cytotec PO at 2000. After that she contracted every 2-4 min all night. She recived one dose of IV Fentanyl for pain late in the evening. She rceeived an epidural at approx. 0500. SVE 2cm at time of epidural request.     Epidural bag hung on PCA pump. Pain 0/10.

## 2023-07-12 NOTE — PROCEDURES
DELIVERY NOTE    PREOPERATIVE DIAGNOSIS: Intrauterine pregnancy at 39 weeks gestation    POSTOPERATIVE DIAGNOSIS: Intrauterine pregnancy at 39 weeks gestation delivered    PROCEDURE  Low vacuum extraction  Amniotomy  Electronic fetal monitoring    ANESTHESIA: Epidural    ANESTHESIOLOGIST:Jen    SURGEON: Moisés    ASSISTANT:none    COMPLICATIONS: None    CONDITION DURING PROCEDURE: Stable    ESTIMATED BLOOD LOSS:200 ml    SURGICAL COUNT:correct    SPECIMEN: None    FINDINGS: Male infant, cephalic presentation, JONNY. Apgar's:  Weight: Skin-to-skin. Intact placenta. Three-vessel cord. DESCRIPTION OF PROCEDURE: The patient was admitted last evening for Cytotec prior to induction of labor this morning. On exam this morning she was found to be 6 cm and amniotomy was performed at that time. Pitocin was started and the patient progressed through a relatively slow labor eventually reaching complete dilatation after pushing for over an hour and a half decision was made to assist with a vacuum extraction. Low vacuum extraction was performed and the head delivered in right occiput anterior position. The remainder of the infant was then delivered. The oropharynx was bulb suctioned on delivery. The cord was clamped and cut after 1 minute delay. The infant was handed to the waiting pediatric attendant to be cleaned up initially. Patient was examined and noted to have a second-degree perineal laceration. This was repaired with 3-0 Vicryl. Patient was then examined and noted to have good hemostasis.

## 2023-07-12 NOTE — ANESTHESIA PROCEDURE NOTES
Epidural Block    Patient location during procedure: OB  Start time: 7/12/2023 5:45 AM  End time: 7/12/2023 5:36 AM  Reason for block: procedure for pain and labor epidural  Staffing  Performed: resident/CRNA   Resident/CRNA: PATRICE Leslie - CRNA  Epidural  Patient position: sitting  Prep: Betadine  Patient monitoring: cardiac monitor, continuous pulse ox and frequent blood pressure checks  Approach: midline  Location: L4-5  Injection technique: RONALD air  Provider prep: mask, sterile gloves and sterile gown  Needle  Needle type: Tuohy   Needle gauge: 17 G  Needle length: 6 in  Needle insertion depth: 5 cm  Catheter type: multi-orifice  Catheter size: 20 G  Catheter at skin depth: 10 cm  Test dose: negativeCatheter Secured: tegaderm and tape  Assessment  Sensory level: T10  Hemodynamics: stable  Attempts: 1  Outcomes: uncomplicated and patient tolerated procedure well  Additional Notes  Tolerated well patient stable  Preanesthetic Checklist  Completed: patient identified, IV checked, site marked, risks and benefits discussed, surgical/procedural consents, equipment checked, pre-op evaluation, timeout performed, anesthesia consent given, oxygen available, monitors applied/VS acknowledged, fire risk safety assessment completed and verbalized and blood product R/B/A discussed and consented

## 2023-07-13 LAB
ALBUMIN SERPL-MCNC: 2.3 G/DL (ref 3.5–5)
ALBUMIN/GLOB SERPL: 0.6 (ref 1.1–2.2)
ALP SERPL-CCNC: 404 U/L (ref 45–117)
ALT SERPL-CCNC: 15 U/L (ref 12–78)
ANION GAP SERPL CALC-SCNC: 6 MMOL/L (ref 5–15)
AST SERPL W P-5'-P-CCNC: 25 U/L (ref 15–37)
BASOPHILS # BLD: 0 K/UL (ref 0–0.1)
BASOPHILS NFR BLD: 0 % (ref 0–1)
BILIRUB SERPL-MCNC: 0.4 MG/DL (ref 0.2–1)
BLASTS NFR BLD MANUAL: 0 %
BUN SERPL-MCNC: 4 MG/DL (ref 6–20)
BUN/CREAT SERPL: 5 (ref 12–20)
CA-I BLD-MCNC: 8.7 MG/DL (ref 8.5–10.1)
CHLORIDE SERPL-SCNC: 112 MMOL/L (ref 97–108)
CO2 SERPL-SCNC: 23 MMOL/L (ref 21–32)
CREAT SERPL-MCNC: 0.82 MG/DL (ref 0.55–1.02)
DIFFERENTIAL METHOD BLD: ABNORMAL
EOSINOPHIL # BLD: 0 K/UL (ref 0–0.4)
EOSINOPHIL NFR BLD: 0 % (ref 0–7)
ERYTHROCYTE [DISTWIDTH] IN BLOOD BY AUTOMATED COUNT: 14.9 % (ref 11.5–14.5)
GLOBULIN SER CALC-MCNC: 3.6 G/DL (ref 2–4)
GLUCOSE SERPL-MCNC: 106 MG/DL (ref 65–100)
HCT VFR BLD AUTO: 28.6 % (ref 35–47)
HGB BLD-MCNC: 9.4 G/DL (ref 11.5–16)
IMM GRANULOCYTES # BLD AUTO: 0 K/UL
IMM GRANULOCYTES NFR BLD AUTO: 0 %
LYMPHOCYTES # BLD: 1.1 K/UL (ref 0.8–3.5)
LYMPHOCYTES NFR BLD: 8 % (ref 12–49)
MANUAL DIFFERENTIAL PERFORMED BLD QL: ABNORMAL
MCH RBC QN AUTO: 30.2 PG (ref 26–34)
MCHC RBC AUTO-ENTMCNC: 32.9 G/DL (ref 30–36.5)
MCV RBC AUTO: 92 FL (ref 80–99)
METAMYELOCYTES NFR BLD MANUAL: 0 %
MONOCYTES # BLD: 0.3 K/UL (ref 0–1)
MONOCYTES NFR BLD: 2 % (ref 5–13)
MYELOCYTES NFR BLD MANUAL: 0 %
NEUTS BAND NFR BLD MANUAL: 0 % (ref 0–6)
NEUTS SEG # BLD: 12.9 K/UL (ref 1.8–8)
NEUTS SEG NFR BLD: 90 % (ref 32–75)
NRBC # BLD: 0 K/UL (ref 0–0.01)
NRBC BLD-RTO: 0 PER 100 WBC
OTHER CELLS NFR BLD MANUAL: 0 %
PLATELET # BLD AUTO: 250 K/UL (ref 150–400)
PMV BLD AUTO: 11 FL (ref 8.9–12.9)
POTASSIUM SERPL-SCNC: 3.3 MMOL/L (ref 3.5–5.1)
PROMYELOCYTES NFR BLD MANUAL: 0 %
PROT SERPL-MCNC: 5.9 G/DL (ref 6.4–8.2)
RBC # BLD AUTO: 3.11 M/UL (ref 3.8–5.2)
RBC MORPH BLD: ABNORMAL
SODIUM SERPL-SCNC: 141 MMOL/L (ref 136–145)
WBC # BLD AUTO: 14.3 K/UL (ref 3.6–11)

## 2023-07-13 PROCEDURE — 83935 ASSAY OF URINE OSMOLALITY: CPT

## 2023-07-13 PROCEDURE — 6370000000 HC RX 637 (ALT 250 FOR IP): Performed by: OBSTETRICS & GYNECOLOGY

## 2023-07-13 PROCEDURE — 85007 BL SMEAR W/DIFF WBC COUNT: CPT

## 2023-07-13 PROCEDURE — 80053 COMPREHEN METABOLIC PANEL: CPT

## 2023-07-13 PROCEDURE — 36415 COLL VENOUS BLD VENIPUNCTURE: CPT

## 2023-07-13 PROCEDURE — 85027 COMPLETE CBC AUTOMATED: CPT

## 2023-07-13 PROCEDURE — 1120000000 HC RM PRIVATE OB

## 2023-07-13 PROCEDURE — 83930 ASSAY OF BLOOD OSMOLALITY: CPT

## 2023-07-13 RX ADMIN — IBUPROFEN 800 MG: 800 TABLET, FILM COATED ORAL at 07:10

## 2023-07-13 RX ADMIN — IBUPROFEN 800 MG: 800 TABLET, FILM COATED ORAL at 14:03

## 2023-07-13 RX ADMIN — IBUPROFEN 800 MG: 800 TABLET, FILM COATED ORAL at 23:22

## 2023-07-13 RX ADMIN — DOCUSATE SODIUM 100 MG: 100 CAPSULE, LIQUID FILLED ORAL at 21:32

## 2023-07-13 ASSESSMENT — PAIN SCALES - GENERAL: PAINLEVEL_OUTOF10: 0

## 2023-07-13 NOTE — H&P
Consult    Patient: Kennedi Brown MRN: [id number]  SSN: [id number]    YOB: 2000  Age: 21 y.o. Sex: female      Subjective:   Chief complaint on admission:    Kennedi Brown is a 21 y.o. female  with a history of asthma, hypoglycemia, and hypotension  was admitted with a pregnancy at 39w2d for inductions of labor. Patient delivered 23 without any immediate complications. Patient has since had increased urinary output since delivery, approximately 4-5 liters today and there is concern for possible diabetes insipidus. Will order urine osmolality and serum osmolality. NPO with nothing by mouth until midnight, repeat labs at this time. Further recommendations once labs are available. Patient was seen at the bedside. She denies shortness of breath, fever, chills, urgency or frequency. Denies increased thirst.     Past Medical History:   Diagnosis Date    Asthma     Hypoglycemia     Hypotension      History reviewed. No pertinent surgical history. Family History   Problem Relation Age of Onset     Labor Mother     Breast Cancer Paternal Grandmother      Social History     Tobacco Use    Smoking status: Former     Types: Cigarettes    Smokeless tobacco: Never   Substance Use Topics    Alcohol use: Not Currently      Prior to Admission medications    Medication Sig Start Date End Date Taking?  Authorizing Provider   promethazine (PHENERGAN) 12.5 MG tablet Take 1 tablet by mouth every 6 hours as needed for Nausea  Patient not taking: Reported on 2023    Historical Provider, MD   ondansetron (ZOFRAN) 4 MG tablet Take 1 tablet by mouth every 8 hours as needed for Nausea or Vomiting  Patient not taking: Reported on 2023    Historical Provider, MD   Prenatal Vit-Iron Carbonyl-FA (PNV TABS 29-1 PO) Take by mouth    Historical Provider, MD   albuterol sulfate HFA (PROVENTIL;VENTOLIN;PROAIR) 108 (90 Base) MCG/ACT inhaler Inhale 2 puffs into the lungs every 6 hours as needed for Wheezing  Patient not taking: Reported on 7/11/2023    Historical Provider, MD        No Known Allergies           Objective:     Recent Results (from the past 24 hour(s))   Comprehensive Metabolic Panel    Collection Time: 07/13/23  2:39 PM   Result Value Ref Range    Sodium 141 136 - 145 mmol/L    Potassium 3.3 (L) 3.5 - 5.1 mmol/L    Chloride 112 (H) 97 - 108 mmol/L    CO2 23 21 - 32 mmol/L    Anion Gap 6 5 - 15 mmol/L    Glucose 106 (H) 65 - 100 mg/dL    BUN 4 (L) 6 - 20 mg/dL    Creatinine 0.82 0.55 - 1.02 mg/dL    Bun/Cre Ratio 5 (L) 12 - 20      Est, Glom Filt Rate >60 >60 ml/min/1.73m2    Calcium 8.7 8.5 - 10.1 mg/dL    Total Bilirubin 0.4 0.2 - 1.0 mg/dL    AST 25 15 - 37 U/L    ALT 15 12 - 78 U/L    Alk Phosphatase 404 (H) 45 - 117 U/L    Total Protein 5.9 (L) 6.4 - 8.2 g/dL    Albumin 2.3 (L) 3.5 - 5.0 g/dL    Globulin 3.6 2.0 - 4.0 g/dL    Albumin/Globulin Ratio 0.6 (L) 1.1 - 2.2     CBC with Manual Differential    Collection Time: 07/13/23  2:39 PM   Result Value Ref Range    WBC 14.3 (H) 3.6 - 11.0 K/uL    RBC 3.11 (L) 3.80 - 5.20 M/uL    Hemoglobin 9.4 (L) 11.5 - 16.0 g/dL    Hematocrit 28.6 (L) 35.0 - 47.0 %    MCV 92.0 80.0 - 99.0 FL    MCH 30.2 26.0 - 34.0 PG    MCHC 32.9 30.0 - 36.5 g/dL    RDW 14.9 (H) 11.5 - 14.5 %    Platelets 223 051 - 337 K/uL    MPV 11.0 8.9 - 12.9 FL    Nucleated RBCs 0.0 0.0  WBC    nRBC 0.00 0.00 - 0.01 K/uL    Neutrophils % 90 (H) 32 - 75 %    Band Neutrophils 0 0 - 6 %    Lymphocytes % 8 (L) 12 - 49 %    Monocytes % 2 (L) 5 - 13 %    Eosinophils % 0 0 - 7 %    Basophils % 0 0 - 1 %    Metamyelocytes 0 0 %    Myelocytes 0 0 %    Promyelocytes 0 0 %    Blasts 0 0 %    Other cells, fluid 0 %    Immature Granulocytes 0 %    Neutrophils Absolute 12.9 (H) 1.8 - 8.0 K/UL    Lymphocytes Absolute 1.1 0.8 - 3.5 K/UL    Monocytes Absolute 0.3 0.0 - 1.0 K/UL    Eosinophils Absolute 0.0 0.0 - 0.4 K/UL

## 2023-07-14 LAB
ALBUMIN SERPL-MCNC: 2.3 G/DL (ref 3.5–5)
ALBUMIN/GLOB SERPL: 0.7 (ref 1.1–2.2)
ALP SERPL-CCNC: 344 U/L (ref 45–117)
ALT SERPL-CCNC: 16 U/L (ref 12–78)
ANION GAP SERPL CALC-SCNC: 4 MMOL/L (ref 5–15)
AST SERPL W P-5'-P-CCNC: 19 U/L (ref 15–37)
BILIRUB SERPL-MCNC: 0.3 MG/DL (ref 0.2–1)
BUN SERPL-MCNC: 6 MG/DL (ref 6–20)
BUN/CREAT SERPL: 8 (ref 12–20)
CA-I BLD-MCNC: 8.5 MG/DL (ref 8.5–10.1)
CHLORIDE SERPL-SCNC: 109 MMOL/L (ref 97–108)
CO2 SERPL-SCNC: 27 MMOL/L (ref 21–32)
CREAT SERPL-MCNC: 0.73 MG/DL (ref 0.55–1.02)
GLOBULIN SER CALC-MCNC: 3.5 G/DL (ref 2–4)
GLUCOSE SERPL-MCNC: 74 MG/DL (ref 65–100)
OSMOLALITY SERPL: 279 MOSM/KG H2O
OSMOLALITY UR: 167 MOSM/KG H2O
OSMOLALITY UR: 277 MOSM/KG H2O
POTASSIUM SERPL-SCNC: 3.8 MMOL/L (ref 3.5–5.1)
PROT SERPL-MCNC: 5.8 G/DL (ref 6.4–8.2)
SODIUM SERPL-SCNC: 140 MMOL/L (ref 136–145)

## 2023-07-14 PROCEDURE — 6370000000 HC RX 637 (ALT 250 FOR IP): Performed by: OBSTETRICS & GYNECOLOGY

## 2023-07-14 PROCEDURE — 83930 ASSAY OF BLOOD OSMOLALITY: CPT

## 2023-07-14 PROCEDURE — 1120000000 HC RM PRIVATE OB

## 2023-07-14 PROCEDURE — 2580000003 HC RX 258: Performed by: OBSTETRICS & GYNECOLOGY

## 2023-07-14 PROCEDURE — 83935 ASSAY OF URINE OSMOLALITY: CPT

## 2023-07-14 PROCEDURE — 80053 COMPREHEN METABOLIC PANEL: CPT

## 2023-07-14 RX ORDER — POTASSIUM CHLORIDE 20 MEQ/1
40 TABLET, EXTENDED RELEASE ORAL 2 TIMES DAILY
Status: DISCONTINUED | OUTPATIENT
Start: 2023-07-14 | End: 2023-07-14

## 2023-07-14 RX ORDER — IBUPROFEN 800 MG/1
800 TABLET ORAL EVERY 8 HOURS PRN
Qty: 21 TABLET | Refills: 0 | Status: SHIPPED | OUTPATIENT
Start: 2023-07-14 | End: 2023-07-21

## 2023-07-14 RX ADMIN — IBUPROFEN 800 MG: 800 TABLET, FILM COATED ORAL at 13:40

## 2023-07-14 RX ADMIN — IBUPROFEN 800 MG: 800 TABLET, FILM COATED ORAL at 21:11

## 2023-07-14 RX ADMIN — DOCUSATE SODIUM 100 MG: 100 CAPSULE, LIQUID FILLED ORAL at 21:11

## 2023-07-14 RX ADMIN — DOCUSATE SODIUM 100 MG: 100 CAPSULE, LIQUID FILLED ORAL at 09:19

## 2023-07-14 RX ADMIN — SODIUM CHLORIDE, PRESERVATIVE FREE 10 ML: 5 INJECTION INTRAVENOUS at 21:15

## 2023-07-14 RX ADMIN — WITCH HAZEL: 500 SOLUTION RECTAL; TOPICAL at 21:11

## 2023-07-14 NOTE — LACTATION NOTE
This note was copied from a baby's chart. Baby is awake and frantic at the breast. He has a very narrow gape and I encouraged mother to wait for a wider gape and to take baby off and relatch if his latch hurts. Mother has a wide nipple. Baby is impatient at breast and we gave drops of colostrum via syringe to keep him latched and suckling. Mother denies nipple pain after he is latched and sucking well. She is feeling uterine cramping. Baby nursed well for 15 in. We latched him to right side with drops from syringe and he nursed 15 min here. He took 12 ml of colostrum during and after feeding. Mother encouraged to bff first at each feeding. If baby nurses this well then do not pump or only pump a few min to have colostrum to drip at the next bf. Baby also had a large void.

## 2023-07-14 NOTE — DISCHARGE SUMMARY
Obstetric Discharge Summary    Admitting Diagnosis  IUP 39 weeks for induction of labor  OB History          1    Para   1    Term   1       0    AB   0    Living   1         SAB   0    IAB   0    Ectopic   0    Molar   0    Multiple   0    Live Births   1                Reasons for Admission on 2023  4:59 PM  39 weeks gestation of pregnancy [Z3A.39]  No comment available    Induction of Labor    Prenatal Procedures  None    Intrapartum Procedures        Multiple birth?: No        Spontaneous Vaginal Delivery: -       Postpartum Procedures  None    Postpartum/Operative Complications       [place] Data  Information for the patient's :  Gena Fan [[id number]]   male   Birth Weight: 6 lb 8.9 oz (2.975 kg)   Discharge With Mother  Complications: No    Discharge Diagnosis       Discharge Information  Current Discharge Medication List        CONTINUE these medications which have NOT CHANGED    Details   promethazine (PHENERGAN) 12.5 MG tablet Take 1 tablet by mouth every 6 hours as needed for Nausea      ondansetron (ZOFRAN) 4 MG tablet Take 1 tablet by mouth every 8 hours as needed for Nausea or Vomiting      Prenatal Vit-Iron Carbonyl-FA (PNV TABS 29-1 PO) Take by mouth      albuterol sulfate HFA (PROVENTIL;VENTOLIN;PROAIR) 108 (90 Base) MCG/ACT inhaler Inhale 2 puffs into the lungs every 6 hours as needed for Wheezing             No discharge procedures on file. Discharge to: Home  Follow up in OB office  weeks at 2 weeks and 6 weeks    To evaluate s Osmolality and Polyuria issue. Accompanied by Family.   Concerned  Discharge Date: 23 Time: 4 pm      Comments

## 2023-07-14 NOTE — PROGRESS NOTES
Received consult for evaluation of patient for high UO and concern for DI. Labs reviewed, shelton stable.  Will see in AM.  Discussed with Ms Marian Landers NP.

## 2023-07-14 NOTE — LACTATION NOTE
Halina returned writer's call and was informed that the patient was positive for COVID. She will let the patient know that he needs to quarantine for 5 days from the onset of symptoms and continue to wear a mask for 5 days. She asked about medications and was instructed to inform the patient he can take OTC medications if helpful and to push a lot of fluids. Halina verbalized understanding and stated she would call back if any additional questions came up.    This note was copied from a baby's chart. This is mother's first baby. She wants to breastfeed and offer her milk to baby. She has been struggling to get baby to latch well since delivery and has fed a formula bottle twice now. She just finished feeding baby formula about an hour ago. We tried to put him to breast so I could help mother with positioning and latch but he is satieted and sleepy. I will come back and help with latch at next feeding. Mother has not pumped at all today and I encouraged her to stimulate her breast at least every three hours with bf or pumping. I set up her pump and she pumped 15 in for 15 ml. I encouraged her to bf first each feeding and supplement with ebm if needed. I encouraged her to drink plenty of fluids and rest to help her milk supply. She has a bf book with lactation phone number and latch clinic flyer.

## 2023-07-14 NOTE — PROGRESS NOTES
Hospitalist Progress Note               Daily Progress Note: 2023 2:52 PM  Hospital course:   Chief complaint on admission: admitted with 39w2d pregnancy      Moises Wagner is a 21 y.o. female  with a history of asthma, hypoglycemia, and hypotension  was admitted with a pregnancy at 39w2d for inductions of labor. Patient delivered 23 without any immediate complications. Patient has since had increased urinary output since delivery, approximately 4-5 liters today and there is concern for possible diabetes insipidus. Will order urine osmolality and serum osmolality. NPO with nothing by mouth until midnight, repeat labs at this time. Further recommendations once labs are available. : Nephrology consult pending. Patient continues to have polyuria, however has decreased since yesterday. UOP today 1800 mL. Patient states she is not as thirsty as she was yesterday. Urine osmolality 167 today. K 3.3, will replete. Labs pending. Subjective:     Patient was seen and examined at the bedside. She is tired and sleepy. Offers no new complaints. Assessment/Plan:   Principal Problem:    39 weeks gestation of pregnancy  Active Problems:     (normal spontaneous vaginal delivery)  Resolved Problems:    * No resolved hospital problems. *    Polyuria postpartum  - Nephrology consult pending  - Patient's OP today 4-5L. Concerns for diabetes insipidus. Baseline Urine osmolality pending. Patient was on water restriction overnight  - Urine osmolality 146 today  - K 3.8, Na 140  Repeat labs in the am     Post partum:   - Patient delivered a full term healthy baby boy with no immediate complications post-op. Discussion/MDM: Patient with multiple medical comorbidities, each with high likelihood for morbidity and mortality if left untreated.    I have reviewed patient's presenting subjective and objective findings, as well as all laboratory studies, imaging studies, and vital signs to date as well as treatment

## 2023-07-15 VITALS
SYSTOLIC BLOOD PRESSURE: 133 MMHG | RESPIRATION RATE: 16 BRPM | TEMPERATURE: 98.6 F | DIASTOLIC BLOOD PRESSURE: 85 MMHG | HEART RATE: 71 BPM | WEIGHT: 158 LBS | HEIGHT: 64 IN | OXYGEN SATURATION: 100 % | BODY MASS INDEX: 26.98 KG/M2

## 2023-07-15 LAB
ANION GAP SERPL CALC-SCNC: 6 MMOL/L (ref 5–15)
BASOPHILS # BLD: 0 K/UL (ref 0–0.1)
BASOPHILS NFR BLD: 0 % (ref 0–1)
BUN SERPL-MCNC: 4 MG/DL (ref 6–20)
BUN/CREAT SERPL: 7 (ref 12–20)
CA-I BLD-MCNC: 8.3 MG/DL (ref 8.5–10.1)
CHLORIDE SERPL-SCNC: 110 MMOL/L (ref 97–108)
CO2 SERPL-SCNC: 25 MMOL/L (ref 21–32)
CREAT SERPL-MCNC: 0.61 MG/DL (ref 0.55–1.02)
DIFFERENTIAL METHOD BLD: ABNORMAL
EOSINOPHIL # BLD: 0.1 K/UL (ref 0–0.4)
EOSINOPHIL NFR BLD: 2 % (ref 0–7)
ERYTHROCYTE [DISTWIDTH] IN BLOOD BY AUTOMATED COUNT: 14.7 % (ref 11.5–14.5)
GLUCOSE SERPL-MCNC: 75 MG/DL (ref 65–100)
HCT VFR BLD AUTO: 28.9 % (ref 35–47)
HGB BLD-MCNC: 9.5 G/DL (ref 11.5–16)
IMM GRANULOCYTES # BLD AUTO: 0.1 K/UL (ref 0–0.04)
IMM GRANULOCYTES NFR BLD AUTO: 1 % (ref 0–0.5)
LYMPHOCYTES # BLD: 1.2 K/UL (ref 0.8–3.5)
LYMPHOCYTES NFR BLD: 15 % (ref 12–49)
MCH RBC QN AUTO: 29.8 PG (ref 26–34)
MCHC RBC AUTO-ENTMCNC: 32.9 G/DL (ref 30–36.5)
MCV RBC AUTO: 90.6 FL (ref 80–99)
MONOCYTES # BLD: 0.5 K/UL (ref 0–1)
MONOCYTES NFR BLD: 6 % (ref 5–13)
NEUTS SEG # BLD: 6.2 K/UL (ref 1.8–8)
NEUTS SEG NFR BLD: 76 % (ref 32–75)
NRBC # BLD: 0 K/UL (ref 0–0.01)
NRBC BLD-RTO: 0 PER 100 WBC
PLATELET # BLD AUTO: 296 K/UL (ref 150–400)
PMV BLD AUTO: 10.4 FL (ref 8.9–12.9)
POTASSIUM SERPL-SCNC: 3.9 MMOL/L (ref 3.5–5.1)
RBC # BLD AUTO: 3.19 M/UL (ref 3.8–5.2)
SODIUM SERPL-SCNC: 141 MMOL/L (ref 136–145)
WBC # BLD AUTO: 8.1 K/UL (ref 3.6–11)

## 2023-07-15 PROCEDURE — 36415 COLL VENOUS BLD VENIPUNCTURE: CPT

## 2023-07-15 PROCEDURE — 85025 COMPLETE CBC W/AUTO DIFF WBC: CPT

## 2023-07-15 PROCEDURE — 6370000000 HC RX 637 (ALT 250 FOR IP): Performed by: OBSTETRICS & GYNECOLOGY

## 2023-07-15 PROCEDURE — 2580000003 HC RX 258: Performed by: OBSTETRICS & GYNECOLOGY

## 2023-07-15 PROCEDURE — 80048 BASIC METABOLIC PNL TOTAL CA: CPT

## 2023-07-15 RX ADMIN — SODIUM CHLORIDE, PRESERVATIVE FREE 10 ML: 5 INJECTION INTRAVENOUS at 05:21

## 2023-07-15 RX ADMIN — DOCUSATE SODIUM 100 MG: 100 CAPSULE, LIQUID FILLED ORAL at 09:28

## 2023-07-15 RX ADMIN — IBUPROFEN 800 MG: 800 TABLET, FILM COATED ORAL at 05:20

## 2023-07-15 ASSESSMENT — PAIN SCALES - GENERAL: PAINLEVEL_OUTOF10: 0

## 2023-07-15 ASSESSMENT — ENCOUNTER SYMPTOMS
EYES NEGATIVE: 1
RESPIRATORY NEGATIVE: 1

## 2023-07-15 NOTE — PLAN OF CARE
Problem: Postpartum  Goal: Experiences normal postpartum course  Description:  Postpartum OB-Pregnancy care plan goal which identifies if the mother is experiencing a normal postpartum course  Outcome: HH/HSPC Resolved Met  Goal: Appropriate maternal -  bonding  Description:  Postpartum OB-Pregnancy care plan goal which identifies if the mother and  are bonding appropriately  Outcome: [address] [facility] Resolved Met  Goal: Establishment of infant feeding pattern  Description:  Postpartum OB-Pregnancy care plan goal which identifies if the mother is establishing a feeding pattern with their   Outcome: [facility] 114 Resolved Met  Goal: Incisions, wounds, or drain sites healing without S/S of infection  Outcome: [facility] 114 Resolved Met     Problem: Pain  Goal: Verbalizes/displays adequate comfort level or baseline comfort level  Outcome: [address] [facility] 114 Resolved Met  Flowsheets (Taken 7/15/2023 0850)  Verbalizes/displays adequate comfort level or baseline comfort level:   Encourage patient to monitor pain and request assistance   Assess pain using appropriate pain scale     Problem: Infection - Adult  Goal: Absence of infection at discharge  Outcome: [facility] 114 Resolved Met  Goal: Absence of infection during hospitalization  Outcome: [facility] 114 Resolved Met  Goal: Absence of fever/infection during anticipated neutropenic period  Outcome: HH/HSPC Resolved Met     Problem: Safety - Adult  Goal: Free from fall injury  Outcome: [facility] 114 Resolved Met     Problem: Discharge Planning  Goal: Discharge to home or other facility with appropriate resources  Outcome: [address] [place] [address] Resolved Met    1140 Discharge instructions reviewed with patient. Patient aware Rx's were sent to pharmacy and aware when can take next dose. Aware of warning signs and when to notify MD.     Discharge plan of care/case management plan validated with provider discharge order.       1300 Patient discharged to main entrance via wheelchair in stable condition with baby in

## 2023-07-15 NOTE — PROGRESS NOTES
Hospitalist Progress Note    Subjective:   Daily Progress Note: 7/15/2023     Chief Complaint   Patient presents with    Scheduled Induction      Hospital Course:  Kemal Minor is a 21 y.o. female  with a history of asthma, hypoglycemia, and hypotension  was admitted with a pregnancy at 39w2d for inductions of labor. Patient delivered 07/12/23 without any immediate complications. Patient has since had increased urinary output since delivery, approximately 4-5 liters today and there is concern for possible diabetes insipidus. Will order urine osmolality and serum osmolality. NPO with nothing by mouth until midnight, repeat labs at this time. Further recommendations once labs are available. 7/14: Nephrology consult pending. Patient continues to have polyuria, however has decreased since yesterday. UOP today 1800 mL. Patient states she is not as thirsty as she was yesterday. Urine osmolality 167 today. K 3.3, will replete. Labs pending.     7/15  Na 141  Cr 0.61 hgb 9.5 output  1800 last 8 hrs     Subjective: pt feels well she states urine output has slowed down noted to be at only 1600 over the past 8 hours    Current Facility-Administered Medications   Medication Dose Route Frequency    witch hazel-glycerin (TUCKS) pad   Topical PRN    oxytocin (PITOCIN) 30 units in 500 mL infusion  1-20 shea-units/min IntraVENous Continuous    sodium chloride flush 0.9 % injection 5-40 mL  5-40 mL IntraVENous 2 times per day    sodium chloride flush 0.9 % injection 5-40 mL  5-40 mL IntraVENous PRN    0.9 % sodium chloride infusion   IntraVENous PRN    ibuprofen (ADVIL;MOTRIN) tablet 800 mg  800 mg Oral 3 times per day    docusate sodium (COLACE) capsule 100 mg  100 mg Oral BID    lansinoh lanolin ointment   Topical PRN    tetanus-diphth-acell pertussis (BOOSTRIX) injection 0.5 mL  0.5 mL IntraMUSCular Prior to discharge    zolpidem (AMBIEN) tablet 5 mg  5 mg Oral Nightly PRN        Review of Systems:    Review of Systems

## 2023-07-15 NOTE — DISCHARGE SUMMARY
Obstetrical Discharge Summary     Name: Lennie Sahu MRN: [id number]  SSN: [id number]    YOB: 2000  Age: 21 y.o. Sex: female      Allergies: Patient has no known allergies. Admit Date: 2023    Discharge Date: 7/15/2023     Admitting Physician: Richelle Garcia MD     Attending Physician:  Richelle Garcia MD     * Admission Diagnoses: 39 weeks gestation of pregnancy [Z3A.39]    * Discharge Diagnoses:   Information for the patient's :  Yanna Hall [[id number]]   @[id number]@      Additional Diagnoses:    Lab Results   Component Value Date/Time    ABORH O Positive 2023 05:35 PM       Immunization(s): There is no immunization history for the selected administration types on file for this patient. * Procedures: vaginal delivery             * Discharge Condition: Stable    * Hospital Course: normal post partum course, evaluation for polyuria     * Disposition: Home    Discharge Medications:      Medication List        START taking these medications      ibuprofen 800 MG tablet  Commonly known as: ADVIL;MOTRIN  Take 1 tablet by mouth every 8 hours as needed for Pain            CONTINUE taking these medications      PNV TABS 29-1 PO            STOP taking these medications      albuterol sulfate  (90 Base) MCG/ACT inhaler  Commonly known as: PROVENTIL;VENTOLIN;PROAIR     ondansetron 4 MG tablet  Commonly known as: ZOFRAN     promethazine 12.5 MG tablet  Commonly known as: PHENERGAN               Where to Get Your Medications        These medications were sent to [address] [place] [address] [phone] [place] [address]      Phone: [phone]   ibuprofen 800 MG tablet         * Follow-up Care/Patient Instructions:   Activity: activity as tolerated and no sex for 6 weeks  Diet: regular diet  Wound Care: none needed    Follow up in 6 weeks for postpartum  Follow up with nephrology as

## 2023-09-05 ENCOUNTER — POSTPARTUM VISIT (OUTPATIENT)
Age: 23
End: 2023-09-05

## 2023-09-05 VITALS — WEIGHT: 122 LBS | DIASTOLIC BLOOD PRESSURE: 60 MMHG | SYSTOLIC BLOOD PRESSURE: 100 MMHG | BODY MASS INDEX: 20.94 KG/M2

## 2023-09-05 DIAGNOSIS — N91.2 AMENORRHEA: Primary | ICD-10-CM

## 2023-09-05 PROCEDURE — 0503F POSTPARTUM CARE VISIT: CPT | Performed by: OBSTETRICS & GYNECOLOGY

## 2023-09-05 RX ORDER — MEDROXYPROGESTERONE ACETATE 150 MG/ML
150 INJECTION, SUSPENSION INTRAMUSCULAR
Qty: 1 ML | Refills: 1 | Status: SHIPPED | OUTPATIENT
Start: 2023-09-05 | End: 2023-09-06 | Stop reason: SDUPTHER

## 2023-09-05 NOTE — PROGRESS NOTES
Lennie Sahu is a 21 y.o. female who presents today for the following:  Chief Complaint   Patient presents with    Postpartum Care          HPI  Patient is a 77-year-old G1, P3 female who presents today for routine postpartum exam.  She is without complaints on presentation today. OB History          1    Para   1    Term   1       0    AB   0    Living   1         SAB   0    IAB   0    Ectopic   0    Molar   0    Multiple   0    Live Births   1                      @VS2@   OBGyn Exam   Patient is a well-developed well-nourished female no apparent distress  She is alert and oriented x3  Pelvic  External genitalia are within normal limits  Urethra is midline there are no apparent urethral lesions the bladder is within normal limits  Vagina is with normal rugae there is minimal discharge present in the vaginal vault  Cervix is parous, there are no apparent cervical lesions, there is no cervical motion tenderness  Uterus is normal size and contours  Adnexa are without masses  [unfilled]       Assessment:  Normal postpartum exam  Plan: Follow-up as needed and yearly    No orders of the defined types were placed in this encounter.

## 2023-09-06 ENCOUNTER — OFFICE VISIT (OUTPATIENT)
Age: 23
End: 2023-09-06
Payer: COMMERCIAL

## 2023-09-06 VITALS — WEIGHT: 124.06 LBS | HEIGHT: 64 IN | BODY MASS INDEX: 21.18 KG/M2

## 2023-09-06 DIAGNOSIS — N94.89 SUPPRESSION OF MENSES: Primary | ICD-10-CM

## 2023-09-06 PROCEDURE — 96372 THER/PROPH/DIAG INJ SC/IM: CPT | Performed by: OBSTETRICS & GYNECOLOGY

## 2023-09-06 RX ORDER — MEDROXYPROGESTERONE ACETATE 150 MG/ML
150 INJECTION, SUSPENSION INTRAMUSCULAR
Status: SHIPPED | OUTPATIENT
Start: 2023-09-06

## 2023-09-06 RX ADMIN — MEDROXYPROGESTERONE ACETATE 150 MG: 150 INJECTION, SUSPENSION INTRAMUSCULAR at 12:16

## 2023-09-07 LAB — HCG INTACT+B SERPL-ACNC: <1 MIU/ML
